# Patient Record
Sex: FEMALE | Race: OTHER | NOT HISPANIC OR LATINO | URBAN - METROPOLITAN AREA
[De-identification: names, ages, dates, MRNs, and addresses within clinical notes are randomized per-mention and may not be internally consistent; named-entity substitution may affect disease eponyms.]

---

## 2017-09-28 PROBLEM — Z00.00 ENCOUNTER FOR PREVENTIVE HEALTH EXAMINATION: Status: ACTIVE | Noted: 2017-09-28

## 2023-12-19 ENCOUNTER — INPATIENT (INPATIENT)
Facility: HOSPITAL | Age: 38
LOS: 0 days | Discharge: HOME | DRG: 919 | End: 2023-12-20
Attending: OBSTETRICS & GYNECOLOGY | Admitting: OBSTETRICS & GYNECOLOGY
Payer: COMMERCIAL

## 2023-12-19 VITALS
DIASTOLIC BLOOD PRESSURE: 74 MMHG | HEIGHT: 62 IN | SYSTOLIC BLOOD PRESSURE: 107 MMHG | HEART RATE: 89 BPM | OXYGEN SATURATION: 98 % | WEIGHT: 117.95 LBS | RESPIRATION RATE: 18 BRPM | TEMPERATURE: 98 F

## 2023-12-19 DIAGNOSIS — R10.9 UNSPECIFIED ABDOMINAL PAIN: ICD-10-CM

## 2023-12-19 DIAGNOSIS — Z98.890 OTHER SPECIFIED POSTPROCEDURAL STATES: Chronic | ICD-10-CM

## 2023-12-19 LAB
ALBUMIN SERPL ELPH-MCNC: 4.1 G/DL — SIGNIFICANT CHANGE UP (ref 3.3–5)
ALBUMIN SERPL ELPH-MCNC: 4.1 G/DL — SIGNIFICANT CHANGE UP (ref 3.3–5)
ALP SERPL-CCNC: 224 U/L — HIGH (ref 40–120)
ALP SERPL-CCNC: 224 U/L — HIGH (ref 40–120)
ALT FLD-CCNC: 56 U/L — HIGH (ref 10–45)
ALT FLD-CCNC: 56 U/L — HIGH (ref 10–45)
ANION GAP SERPL CALC-SCNC: 9 MMOL/L — SIGNIFICANT CHANGE UP (ref 5–17)
ANION GAP SERPL CALC-SCNC: 9 MMOL/L — SIGNIFICANT CHANGE UP (ref 5–17)
APPEARANCE UR: CLEAR — SIGNIFICANT CHANGE UP
APPEARANCE UR: CLEAR — SIGNIFICANT CHANGE UP
APTT BLD: 29.6 SEC — SIGNIFICANT CHANGE UP (ref 24.5–35.6)
APTT BLD: 29.6 SEC — SIGNIFICANT CHANGE UP (ref 24.5–35.6)
AST SERPL-CCNC: 35 U/L — SIGNIFICANT CHANGE UP (ref 10–40)
AST SERPL-CCNC: 35 U/L — SIGNIFICANT CHANGE UP (ref 10–40)
BACTERIA # UR AUTO: NEGATIVE /HPF — SIGNIFICANT CHANGE UP
BACTERIA # UR AUTO: NEGATIVE /HPF — SIGNIFICANT CHANGE UP
BASE EXCESS BLDV CALC-SCNC: -1.1 MMOL/L — SIGNIFICANT CHANGE UP (ref -2–3)
BASE EXCESS BLDV CALC-SCNC: -1.1 MMOL/L — SIGNIFICANT CHANGE UP (ref -2–3)
BASOPHILS # BLD AUTO: 0.04 K/UL — SIGNIFICANT CHANGE UP (ref 0–0.2)
BASOPHILS # BLD AUTO: 0.04 K/UL — SIGNIFICANT CHANGE UP (ref 0–0.2)
BASOPHILS # BLD AUTO: 0.05 K/UL — SIGNIFICANT CHANGE UP (ref 0–0.2)
BASOPHILS # BLD AUTO: 0.05 K/UL — SIGNIFICANT CHANGE UP (ref 0–0.2)
BASOPHILS NFR BLD AUTO: 0.6 % — SIGNIFICANT CHANGE UP (ref 0–2)
BASOPHILS NFR BLD AUTO: 0.6 % — SIGNIFICANT CHANGE UP (ref 0–2)
BASOPHILS NFR BLD AUTO: 0.8 % — SIGNIFICANT CHANGE UP (ref 0–2)
BASOPHILS NFR BLD AUTO: 0.8 % — SIGNIFICANT CHANGE UP (ref 0–2)
BILIRUB SERPL-MCNC: 0.3 MG/DL — SIGNIFICANT CHANGE UP (ref 0.2–1.2)
BILIRUB SERPL-MCNC: 0.3 MG/DL — SIGNIFICANT CHANGE UP (ref 0.2–1.2)
BILIRUB UR-MCNC: NEGATIVE — SIGNIFICANT CHANGE UP
BILIRUB UR-MCNC: NEGATIVE — SIGNIFICANT CHANGE UP
BUN SERPL-MCNC: 14 MG/DL — SIGNIFICANT CHANGE UP (ref 7–23)
BUN SERPL-MCNC: 14 MG/DL — SIGNIFICANT CHANGE UP (ref 7–23)
CA-I SERPL-SCNC: 1.23 MMOL/L — SIGNIFICANT CHANGE UP (ref 1.15–1.33)
CA-I SERPL-SCNC: 1.23 MMOL/L — SIGNIFICANT CHANGE UP (ref 1.15–1.33)
CALCIUM SERPL-MCNC: 9.2 MG/DL — SIGNIFICANT CHANGE UP (ref 8.4–10.5)
CALCIUM SERPL-MCNC: 9.2 MG/DL — SIGNIFICANT CHANGE UP (ref 8.4–10.5)
CAST: 0 /LPF — SIGNIFICANT CHANGE UP (ref 0–4)
CAST: 0 /LPF — SIGNIFICANT CHANGE UP (ref 0–4)
CHLORIDE BLDV-SCNC: 105 MMOL/L — SIGNIFICANT CHANGE UP (ref 96–108)
CHLORIDE BLDV-SCNC: 105 MMOL/L — SIGNIFICANT CHANGE UP (ref 96–108)
CHLORIDE SERPL-SCNC: 106 MMOL/L — SIGNIFICANT CHANGE UP (ref 96–108)
CHLORIDE SERPL-SCNC: 106 MMOL/L — SIGNIFICANT CHANGE UP (ref 96–108)
CO2 BLDV-SCNC: 27 MMOL/L — HIGH (ref 22–26)
CO2 BLDV-SCNC: 27 MMOL/L — HIGH (ref 22–26)
CO2 SERPL-SCNC: 23 MMOL/L — SIGNIFICANT CHANGE UP (ref 22–31)
CO2 SERPL-SCNC: 23 MMOL/L — SIGNIFICANT CHANGE UP (ref 22–31)
COLOR SPEC: YELLOW — SIGNIFICANT CHANGE UP
COLOR SPEC: YELLOW — SIGNIFICANT CHANGE UP
CREAT SERPL-MCNC: 0.53 MG/DL — SIGNIFICANT CHANGE UP (ref 0.5–1.3)
CREAT SERPL-MCNC: 0.53 MG/DL — SIGNIFICANT CHANGE UP (ref 0.5–1.3)
DIFF PNL FLD: ABNORMAL
DIFF PNL FLD: ABNORMAL
EGFR: 123 ML/MIN/1.73M2 — SIGNIFICANT CHANGE UP
EGFR: 123 ML/MIN/1.73M2 — SIGNIFICANT CHANGE UP
EOSINOPHIL # BLD AUTO: 0.06 K/UL — SIGNIFICANT CHANGE UP (ref 0–0.5)
EOSINOPHIL # BLD AUTO: 0.06 K/UL — SIGNIFICANT CHANGE UP (ref 0–0.5)
EOSINOPHIL # BLD AUTO: 0.09 K/UL — SIGNIFICANT CHANGE UP (ref 0–0.5)
EOSINOPHIL # BLD AUTO: 0.09 K/UL — SIGNIFICANT CHANGE UP (ref 0–0.5)
EOSINOPHIL NFR BLD AUTO: 0.8 % — SIGNIFICANT CHANGE UP (ref 0–6)
EOSINOPHIL NFR BLD AUTO: 0.8 % — SIGNIFICANT CHANGE UP (ref 0–6)
EOSINOPHIL NFR BLD AUTO: 1.7 % — SIGNIFICANT CHANGE UP (ref 0–6)
EOSINOPHIL NFR BLD AUTO: 1.7 % — SIGNIFICANT CHANGE UP (ref 0–6)
GAS PNL BLDV: 135 MMOL/L — LOW (ref 136–145)
GAS PNL BLDV: 135 MMOL/L — LOW (ref 136–145)
GAS PNL BLDV: SIGNIFICANT CHANGE UP
GLUCOSE BLDV-MCNC: 100 MG/DL — HIGH (ref 70–99)
GLUCOSE BLDV-MCNC: 100 MG/DL — HIGH (ref 70–99)
GLUCOSE SERPL-MCNC: 101 MG/DL — HIGH (ref 70–99)
GLUCOSE SERPL-MCNC: 101 MG/DL — HIGH (ref 70–99)
GLUCOSE UR QL: NEGATIVE MG/DL — SIGNIFICANT CHANGE UP
GLUCOSE UR QL: NEGATIVE MG/DL — SIGNIFICANT CHANGE UP
HCG SERPL-ACNC: <2 MIU/ML — SIGNIFICANT CHANGE UP
HCG SERPL-ACNC: <2 MIU/ML — SIGNIFICANT CHANGE UP
HCO3 BLDV-SCNC: 25 MMOL/L — SIGNIFICANT CHANGE UP (ref 22–29)
HCO3 BLDV-SCNC: 25 MMOL/L — SIGNIFICANT CHANGE UP (ref 22–29)
HCT VFR BLD CALC: 33.7 % — LOW (ref 34.5–45)
HCT VFR BLD CALC: 33.7 % — LOW (ref 34.5–45)
HCT VFR BLD CALC: 33.9 % — LOW (ref 34.5–45)
HCT VFR BLD CALC: 33.9 % — LOW (ref 34.5–45)
HCT VFR BLD CALC: 39 % — SIGNIFICANT CHANGE UP (ref 34.5–45)
HCT VFR BLD CALC: 39 % — SIGNIFICANT CHANGE UP (ref 34.5–45)
HCT VFR BLDA CALC: 39 % — SIGNIFICANT CHANGE UP (ref 34.5–46.5)
HCT VFR BLDA CALC: 39 % — SIGNIFICANT CHANGE UP (ref 34.5–46.5)
HGB BLD CALC-MCNC: 12.9 G/DL — SIGNIFICANT CHANGE UP (ref 11.7–16.1)
HGB BLD CALC-MCNC: 12.9 G/DL — SIGNIFICANT CHANGE UP (ref 11.7–16.1)
HGB BLD-MCNC: 10.6 G/DL — LOW (ref 11.5–15.5)
HGB BLD-MCNC: 10.6 G/DL — LOW (ref 11.5–15.5)
HGB BLD-MCNC: 10.9 G/DL — LOW (ref 11.5–15.5)
HGB BLD-MCNC: 10.9 G/DL — LOW (ref 11.5–15.5)
HGB BLD-MCNC: 12.2 G/DL — SIGNIFICANT CHANGE UP (ref 11.5–15.5)
HGB BLD-MCNC: 12.2 G/DL — SIGNIFICANT CHANGE UP (ref 11.5–15.5)
IMM GRANULOCYTES NFR BLD AUTO: 0.2 % — SIGNIFICANT CHANGE UP (ref 0–0.9)
IMM GRANULOCYTES NFR BLD AUTO: 0.2 % — SIGNIFICANT CHANGE UP (ref 0–0.9)
IMM GRANULOCYTES NFR BLD AUTO: 0.4 % — SIGNIFICANT CHANGE UP (ref 0–0.9)
IMM GRANULOCYTES NFR BLD AUTO: 0.4 % — SIGNIFICANT CHANGE UP (ref 0–0.9)
INR BLD: 1.07 RATIO — SIGNIFICANT CHANGE UP (ref 0.85–1.18)
INR BLD: 1.07 RATIO — SIGNIFICANT CHANGE UP (ref 0.85–1.18)
KETONES UR-MCNC: NEGATIVE MG/DL — SIGNIFICANT CHANGE UP
KETONES UR-MCNC: NEGATIVE MG/DL — SIGNIFICANT CHANGE UP
LACTATE BLDV-MCNC: 0.9 MMOL/L — SIGNIFICANT CHANGE UP (ref 0.5–2)
LACTATE BLDV-MCNC: 0.9 MMOL/L — SIGNIFICANT CHANGE UP (ref 0.5–2)
LEUKOCYTE ESTERASE UR-ACNC: NEGATIVE — SIGNIFICANT CHANGE UP
LEUKOCYTE ESTERASE UR-ACNC: NEGATIVE — SIGNIFICANT CHANGE UP
LIDOCAIN IGE QN: 24 U/L — SIGNIFICANT CHANGE UP (ref 7–60)
LIDOCAIN IGE QN: 24 U/L — SIGNIFICANT CHANGE UP (ref 7–60)
LYMPHOCYTES # BLD AUTO: 1.28 K/UL — SIGNIFICANT CHANGE UP (ref 1–3.3)
LYMPHOCYTES # BLD AUTO: 1.28 K/UL — SIGNIFICANT CHANGE UP (ref 1–3.3)
LYMPHOCYTES # BLD AUTO: 1.32 K/UL — SIGNIFICANT CHANGE UP (ref 1–3.3)
LYMPHOCYTES # BLD AUTO: 1.32 K/UL — SIGNIFICANT CHANGE UP (ref 1–3.3)
LYMPHOCYTES # BLD AUTO: 16.4 % — SIGNIFICANT CHANGE UP (ref 13–44)
LYMPHOCYTES # BLD AUTO: 16.4 % — SIGNIFICANT CHANGE UP (ref 13–44)
LYMPHOCYTES # BLD AUTO: 25 % — SIGNIFICANT CHANGE UP (ref 13–44)
LYMPHOCYTES # BLD AUTO: 25 % — SIGNIFICANT CHANGE UP (ref 13–44)
MCHC RBC-ENTMCNC: 29.8 PG — SIGNIFICANT CHANGE UP (ref 27–34)
MCHC RBC-ENTMCNC: 30.1 PG — SIGNIFICANT CHANGE UP (ref 27–34)
MCHC RBC-ENTMCNC: 30.1 PG — SIGNIFICANT CHANGE UP (ref 27–34)
MCHC RBC-ENTMCNC: 31.3 GM/DL — LOW (ref 32–36)
MCHC RBC-ENTMCNC: 31.3 GM/DL — LOW (ref 32–36)
MCHC RBC-ENTMCNC: 31.5 GM/DL — LOW (ref 32–36)
MCHC RBC-ENTMCNC: 31.5 GM/DL — LOW (ref 32–36)
MCHC RBC-ENTMCNC: 32.2 GM/DL — SIGNIFICANT CHANGE UP (ref 32–36)
MCHC RBC-ENTMCNC: 32.2 GM/DL — SIGNIFICANT CHANGE UP (ref 32–36)
MCV RBC AUTO: 93.6 FL — SIGNIFICANT CHANGE UP (ref 80–100)
MCV RBC AUTO: 93.6 FL — SIGNIFICANT CHANGE UP (ref 80–100)
MCV RBC AUTO: 94.7 FL — SIGNIFICANT CHANGE UP (ref 80–100)
MCV RBC AUTO: 94.7 FL — SIGNIFICANT CHANGE UP (ref 80–100)
MCV RBC AUTO: 95.1 FL — SIGNIFICANT CHANGE UP (ref 80–100)
MCV RBC AUTO: 95.1 FL — SIGNIFICANT CHANGE UP (ref 80–100)
MONOCYTES # BLD AUTO: 0.5 K/UL — SIGNIFICANT CHANGE UP (ref 0–0.9)
MONOCYTES # BLD AUTO: 0.5 K/UL — SIGNIFICANT CHANGE UP (ref 0–0.9)
MONOCYTES # BLD AUTO: 0.7 K/UL — SIGNIFICANT CHANGE UP (ref 0–0.9)
MONOCYTES # BLD AUTO: 0.7 K/UL — SIGNIFICANT CHANGE UP (ref 0–0.9)
MONOCYTES NFR BLD AUTO: 9 % — SIGNIFICANT CHANGE UP (ref 2–14)
MONOCYTES NFR BLD AUTO: 9 % — SIGNIFICANT CHANGE UP (ref 2–14)
MONOCYTES NFR BLD AUTO: 9.5 % — SIGNIFICANT CHANGE UP (ref 2–14)
MONOCYTES NFR BLD AUTO: 9.5 % — SIGNIFICANT CHANGE UP (ref 2–14)
NEUTROPHILS # BLD AUTO: 3.32 K/UL — SIGNIFICANT CHANGE UP (ref 1.8–7.4)
NEUTROPHILS # BLD AUTO: 3.32 K/UL — SIGNIFICANT CHANGE UP (ref 1.8–7.4)
NEUTROPHILS # BLD AUTO: 5.69 K/UL — SIGNIFICANT CHANGE UP (ref 1.8–7.4)
NEUTROPHILS # BLD AUTO: 5.69 K/UL — SIGNIFICANT CHANGE UP (ref 1.8–7.4)
NEUTROPHILS NFR BLD AUTO: 62.8 % — SIGNIFICANT CHANGE UP (ref 43–77)
NEUTROPHILS NFR BLD AUTO: 62.8 % — SIGNIFICANT CHANGE UP (ref 43–77)
NEUTROPHILS NFR BLD AUTO: 72.8 % — SIGNIFICANT CHANGE UP (ref 43–77)
NEUTROPHILS NFR BLD AUTO: 72.8 % — SIGNIFICANT CHANGE UP (ref 43–77)
NITRITE UR-MCNC: NEGATIVE — SIGNIFICANT CHANGE UP
NITRITE UR-MCNC: NEGATIVE — SIGNIFICANT CHANGE UP
NRBC # BLD: 0 /100 WBCS — SIGNIFICANT CHANGE UP (ref 0–0)
PCO2 BLDV: 48 MMHG — HIGH (ref 39–42)
PCO2 BLDV: 48 MMHG — HIGH (ref 39–42)
PH BLDV: 7.33 — SIGNIFICANT CHANGE UP (ref 7.32–7.43)
PH BLDV: 7.33 — SIGNIFICANT CHANGE UP (ref 7.32–7.43)
PH UR: 5 — SIGNIFICANT CHANGE UP (ref 5–8)
PH UR: 5 — SIGNIFICANT CHANGE UP (ref 5–8)
PLATELET # BLD AUTO: 337 K/UL — SIGNIFICANT CHANGE UP (ref 150–400)
PLATELET # BLD AUTO: 337 K/UL — SIGNIFICANT CHANGE UP (ref 150–400)
PLATELET # BLD AUTO: 388 K/UL — SIGNIFICANT CHANGE UP (ref 150–400)
PLATELET # BLD AUTO: 388 K/UL — SIGNIFICANT CHANGE UP (ref 150–400)
PLATELET # BLD AUTO: 430 K/UL — HIGH (ref 150–400)
PLATELET # BLD AUTO: 430 K/UL — HIGH (ref 150–400)
PO2 BLDV: 39 MMHG — SIGNIFICANT CHANGE UP (ref 25–45)
PO2 BLDV: 39 MMHG — SIGNIFICANT CHANGE UP (ref 25–45)
POTASSIUM BLDV-SCNC: 4.2 MMOL/L — SIGNIFICANT CHANGE UP (ref 3.5–5.1)
POTASSIUM BLDV-SCNC: 4.2 MMOL/L — SIGNIFICANT CHANGE UP (ref 3.5–5.1)
POTASSIUM SERPL-MCNC: 4.2 MMOL/L — SIGNIFICANT CHANGE UP (ref 3.5–5.3)
POTASSIUM SERPL-MCNC: 4.2 MMOL/L — SIGNIFICANT CHANGE UP (ref 3.5–5.3)
POTASSIUM SERPL-SCNC: 4.2 MMOL/L — SIGNIFICANT CHANGE UP (ref 3.5–5.3)
POTASSIUM SERPL-SCNC: 4.2 MMOL/L — SIGNIFICANT CHANGE UP (ref 3.5–5.3)
PROT SERPL-MCNC: 7.1 G/DL — SIGNIFICANT CHANGE UP (ref 6–8.3)
PROT SERPL-MCNC: 7.1 G/DL — SIGNIFICANT CHANGE UP (ref 6–8.3)
PROT UR-MCNC: NEGATIVE MG/DL — SIGNIFICANT CHANGE UP
PROT UR-MCNC: NEGATIVE MG/DL — SIGNIFICANT CHANGE UP
PROTHROM AB SERPL-ACNC: 11.7 SEC — SIGNIFICANT CHANGE UP (ref 9.5–13)
PROTHROM AB SERPL-ACNC: 11.7 SEC — SIGNIFICANT CHANGE UP (ref 9.5–13)
RBC # BLD: 3.56 M/UL — LOW (ref 3.8–5.2)
RBC # BLD: 3.56 M/UL — LOW (ref 3.8–5.2)
RBC # BLD: 3.62 M/UL — LOW (ref 3.8–5.2)
RBC # BLD: 3.62 M/UL — LOW (ref 3.8–5.2)
RBC # BLD: 4.1 M/UL — SIGNIFICANT CHANGE UP (ref 3.8–5.2)
RBC # BLD: 4.1 M/UL — SIGNIFICANT CHANGE UP (ref 3.8–5.2)
RBC # FLD: 13.1 % — SIGNIFICANT CHANGE UP (ref 10.3–14.5)
RBC # FLD: 13.1 % — SIGNIFICANT CHANGE UP (ref 10.3–14.5)
RBC # FLD: 13.2 % — SIGNIFICANT CHANGE UP (ref 10.3–14.5)
RBC # FLD: 13.2 % — SIGNIFICANT CHANGE UP (ref 10.3–14.5)
RBC # FLD: 13.3 % — SIGNIFICANT CHANGE UP (ref 10.3–14.5)
RBC # FLD: 13.3 % — SIGNIFICANT CHANGE UP (ref 10.3–14.5)
RBC CASTS # UR COMP ASSIST: 0 /HPF — SIGNIFICANT CHANGE UP (ref 0–4)
RBC CASTS # UR COMP ASSIST: 0 /HPF — SIGNIFICANT CHANGE UP (ref 0–4)
SAO2 % BLDV: 58.6 % — LOW (ref 67–88)
SAO2 % BLDV: 58.6 % — LOW (ref 67–88)
SODIUM SERPL-SCNC: 138 MMOL/L — SIGNIFICANT CHANGE UP (ref 135–145)
SODIUM SERPL-SCNC: 138 MMOL/L — SIGNIFICANT CHANGE UP (ref 135–145)
SP GR SPEC: 1.03 — SIGNIFICANT CHANGE UP (ref 1–1.03)
SP GR SPEC: 1.03 — SIGNIFICANT CHANGE UP (ref 1–1.03)
SQUAMOUS # UR AUTO: 5 /HPF — SIGNIFICANT CHANGE UP (ref 0–5)
SQUAMOUS # UR AUTO: 5 /HPF — SIGNIFICANT CHANGE UP (ref 0–5)
TROPONIN T, HIGH SENSITIVITY RESULT: <6 NG/L — SIGNIFICANT CHANGE UP (ref 0–51)
TROPONIN T, HIGH SENSITIVITY RESULT: <6 NG/L — SIGNIFICANT CHANGE UP (ref 0–51)
UROBILINOGEN FLD QL: 0.2 MG/DL — SIGNIFICANT CHANGE UP (ref 0.2–1)
UROBILINOGEN FLD QL: 0.2 MG/DL — SIGNIFICANT CHANGE UP (ref 0.2–1)
WBC # BLD: 5.28 K/UL — SIGNIFICANT CHANGE UP (ref 3.8–10.5)
WBC # BLD: 5.28 K/UL — SIGNIFICANT CHANGE UP (ref 3.8–10.5)
WBC # BLD: 6.09 K/UL — SIGNIFICANT CHANGE UP (ref 3.8–10.5)
WBC # BLD: 6.09 K/UL — SIGNIFICANT CHANGE UP (ref 3.8–10.5)
WBC # BLD: 7.81 K/UL — SIGNIFICANT CHANGE UP (ref 3.8–10.5)
WBC # BLD: 7.81 K/UL — SIGNIFICANT CHANGE UP (ref 3.8–10.5)
WBC # FLD AUTO: 5.28 K/UL — SIGNIFICANT CHANGE UP (ref 3.8–10.5)
WBC # FLD AUTO: 5.28 K/UL — SIGNIFICANT CHANGE UP (ref 3.8–10.5)
WBC # FLD AUTO: 6.09 K/UL — SIGNIFICANT CHANGE UP (ref 3.8–10.5)
WBC # FLD AUTO: 6.09 K/UL — SIGNIFICANT CHANGE UP (ref 3.8–10.5)
WBC # FLD AUTO: 7.81 K/UL — SIGNIFICANT CHANGE UP (ref 3.8–10.5)
WBC # FLD AUTO: 7.81 K/UL — SIGNIFICANT CHANGE UP (ref 3.8–10.5)
WBC UR QL: 1 /HPF — SIGNIFICANT CHANGE UP (ref 0–5)
WBC UR QL: 1 /HPF — SIGNIFICANT CHANGE UP (ref 0–5)

## 2023-12-19 PROCEDURE — 93975 VASCULAR STUDY: CPT | Mod: 26

## 2023-12-19 PROCEDURE — 74177 CT ABD & PELVIS W/CONTRAST: CPT | Mod: 26,MA

## 2023-12-19 PROCEDURE — 99222 1ST HOSP IP/OBS MODERATE 55: CPT

## 2023-12-19 PROCEDURE — 76830 TRANSVAGINAL US NON-OB: CPT | Mod: 26

## 2023-12-19 PROCEDURE — 71275 CT ANGIOGRAPHY CHEST: CPT | Mod: 26,MA

## 2023-12-19 PROCEDURE — 99285 EMERGENCY DEPT VISIT HI MDM: CPT

## 2023-12-19 PROCEDURE — 71045 X-RAY EXAM CHEST 1 VIEW: CPT | Mod: 26

## 2023-12-19 RX ORDER — HYDROMORPHONE HYDROCHLORIDE 2 MG/ML
0.5 INJECTION INTRAMUSCULAR; INTRAVENOUS; SUBCUTANEOUS ONCE
Refills: 0 | Status: DISCONTINUED | OUTPATIENT
Start: 2023-12-19 | End: 2023-12-19

## 2023-12-19 RX ORDER — KETOROLAC TROMETHAMINE 30 MG/ML
15 SYRINGE (ML) INJECTION ONCE
Refills: 0 | Status: DISCONTINUED | OUTPATIENT
Start: 2023-12-19 | End: 2023-12-19

## 2023-12-19 RX ORDER — SODIUM CHLORIDE 9 MG/ML
1000 INJECTION, SOLUTION INTRAVENOUS
Refills: 0 | Status: DISCONTINUED | OUTPATIENT
Start: 2023-12-19 | End: 2023-12-20

## 2023-12-19 RX ORDER — ACETAMINOPHEN 500 MG
1000 TABLET ORAL ONCE
Refills: 0 | Status: COMPLETED | OUTPATIENT
Start: 2023-12-19 | End: 2023-12-19

## 2023-12-19 RX ORDER — SODIUM CHLORIDE 9 MG/ML
1000 INJECTION, SOLUTION INTRAVENOUS ONCE
Refills: 0 | Status: COMPLETED | OUTPATIENT
Start: 2023-12-19 | End: 2023-12-19

## 2023-12-19 RX ORDER — ACETAMINOPHEN 500 MG
1000 TABLET ORAL ONCE
Refills: 0 | Status: COMPLETED | OUTPATIENT
Start: 2023-12-20 | End: 2023-12-20

## 2023-12-19 RX ADMIN — SODIUM CHLORIDE 125 MILLILITER(S): 9 INJECTION, SOLUTION INTRAVENOUS at 17:45

## 2023-12-19 RX ADMIN — SODIUM CHLORIDE 1000 MILLILITER(S): 9 INJECTION, SOLUTION INTRAVENOUS at 08:11

## 2023-12-19 RX ADMIN — Medication 1000 MILLIGRAM(S): at 22:09

## 2023-12-19 RX ADMIN — Medication 15 MILLIGRAM(S): at 11:33

## 2023-12-19 RX ADMIN — Medication 400 MILLIGRAM(S): at 08:48

## 2023-12-19 RX ADMIN — Medication 1000 MILLIGRAM(S): at 09:02

## 2023-12-19 RX ADMIN — HYDROMORPHONE HYDROCHLORIDE 0.5 MILLIGRAM(S): 2 INJECTION INTRAMUSCULAR; INTRAVENOUS; SUBCUTANEOUS at 17:44

## 2023-12-19 RX ADMIN — Medication 15 MILLIGRAM(S): at 23:53

## 2023-12-19 RX ADMIN — HYDROMORPHONE HYDROCHLORIDE 0.5 MILLIGRAM(S): 2 INJECTION INTRAMUSCULAR; INTRAVENOUS; SUBCUTANEOUS at 08:11

## 2023-12-19 RX ADMIN — HYDROMORPHONE HYDROCHLORIDE 0.5 MILLIGRAM(S): 2 INJECTION INTRAMUSCULAR; INTRAVENOUS; SUBCUTANEOUS at 09:02

## 2023-12-19 RX ADMIN — Medication 400 MILLIGRAM(S): at 20:26

## 2023-12-19 NOTE — H&P ADULT - NSHPPHYSICALEXAM_GEN_ALL_CORE
Vital Signs Last 24 Hrs  T(C): 36.7 (19 Dec 2023 08:10), Max: 36.7 (19 Dec 2023 06:16)  T(F): 98.1 (19 Dec 2023 08:10), Max: 98.1 (19 Dec 2023 06:16)  HR: 71 (19 Dec 2023 16:40) (61 - 89)  BP: 97/63 (19 Dec 2023 16:40) (97/63 - 124/74)  BP(mean): 74 (19 Dec 2023 16:40) (74 - 74)  RR: 17 (19 Dec 2023 16:40) (17 - 20)  SpO2: 98% (19 Dec 2023 16:40) (98% - 100%)    Parameters below as of 19 Dec 2023 16:40  Patient On (Oxygen Delivery Method): room air    Physical Exam:   General: sitting comfortably in bed, NAD   HEENT: neck supple, full ROM  CV: extremities well perfused  Lungs: normal respiratory effort on room air  Back: No CVA tenderness  Abd: Soft, non-tender, non-distended  :  No bleeding on pad.  External labia wnl. Bimanual exam with no cervical motion tenderness, uterus wnl, adnexa non palpable b/l.  Cervix closed vs. Cervix dilated  Speculum Exam: No active bleeding from os.  Ext: non-tender b/l, no edema Vital Signs Last 24 Hrs  T(C): 36.7 (19 Dec 2023 08:10), Max: 36.7 (19 Dec 2023 06:16)  T(F): 98.1 (19 Dec 2023 08:10), Max: 98.1 (19 Dec 2023 06:16)  HR: 71 (19 Dec 2023 16:40) (61 - 89)  BP: 97/63 (19 Dec 2023 16:40) (97/63 - 124/74)  BP(mean): 74 (19 Dec 2023 16:40) (74 - 74)  RR: 17 (19 Dec 2023 16:40) (17 - 20)  SpO2: 98% (19 Dec 2023 16:40) (98% - 100%)    Parameters below as of 19 Dec 2023 16:40  Patient On (Oxygen Delivery Method): room air    Physical Exam: pt declined chaperone  General: laying comfortably in bed, NAD, sleeping upon entering room  HEENT: neck supple, full ROM  CV: extremities well perfused  Lungs: normal respiratory effort on room air  Abd: Soft, nondistended, abdomen diffusely tender to palpation, no guarding  : Bimanual exam with no cervical motion tenderness. Discomfort diffusely to palpation of b/l adnexa.  Ext: non-tender b/l, no edema

## 2023-12-19 NOTE — ED ADULT NURSE NOTE - OBJECTIVE STATEMENT
38 y.o F A&OX3 with PMH of PCOS presents to the ED c.o sharp RUQ pain radiating up back to neck x2 days. Pt. reports she had egg retrieval procedure on 12/6 and developed hemoperitoneum after procedure which was diagnosed at [facility]. States no treatment was done due to stable hemoglobin levels and was discharged home on oxycodone. Pt. reports she developed pain to RUQ on Sunday x2 days ago and pain progressively worsened now radiating up R back to neck. Rates pain level 10/10. Pt. visibly uncomfortable on initial assessment. Reports last took Tylenol at midnight with minimal relief. Denies fever, chills, nausea, vomiting, diarrhea, melena, hx of dvts, hemoptysis, recent calf pain, or swelling or any other symptoms at this time. EKG done. Pt. placed on CM. IV access obtained. Safety and comfort provided. Family at bedside. 38 y.o F A&OX3 with PMH of PCOS presents to the ED c.o sharp RUQ pain radiating up back to neck x2 days. Pt. reports she had egg retrieval procedure on 12/6 and developed hemoperitoneum after procedure which was diagnosed at [facility]. States no treatment was done due to stable hemoglobin levels and was discharged home on oxycodone. Pt. reports she developed pain to RUQ on Sunday x2 days ago and pain progressively worsened now radiating up R back to neck. Rates pain level 10/10. Pt. visibly uncomfortable on initial assessment. Reports last took Tylenol at midnight with minimal relief. Denies fever, chills, nausea, vomiting, diarrhea, melena, hx of dvts, hemoptysis, recent calf pain, or swelling or any other symptoms at this time. EKG done. Pt. placed on CM. IV access obtained. Safety and comfort provided. Family at bedside.

## 2023-12-19 NOTE — ED ADULT NURSE NOTE - NSFALLUNIVINTERV_ED_ALL_ED
Bed/Stretcher in lowest position, wheels locked, appropriate side rails in place/Call bell, personal items and telephone in reach/Instruct patient to call for assistance before getting out of bed/chair/stretcher/Non-slip footwear applied when patient is off stretcher/[place] to call system/Physically safe environment - no spills, clutter or unnecessary equipment/Purposeful proactive rounding/Room/bathroom lighting operational, light cord in reach Bed/Stretcher in lowest position, wheels locked, appropriate side rails in place/Call bell, personal items and telephone in reach/Instruct patient to call for assistance before getting out of bed/chair/stretcher/Non-slip footwear applied when patient is off stretcher/[place] to call system/Physically safe environment - no spills, clutter or unnecessary equipment/Purposeful proactive rounding/Room/bathroom lighting operational, light cord in reach

## 2023-12-19 NOTE — H&P ADULT - ASSESSMENT
Patient is a 37yo G0 LMP 12/11 now 13 days s/p egg retrieval (12/6/23) with [facility] presenting for acute worsening of right upper abdominal pain radiating to right chest and neck. On arrival to ED, patient's vital signs wnl, labs significant for Hgb 12.2, TVUS showing enlarged ovaries bilaterally R>L, with left side showing evidence of recently ruptured hemorrhagic cyst. Pelvic sono also significant for known hemoperitoneum, likely stable from previous in the setting of normal VS and Hgb higher than patient's report from prior; however, no access to prior studies for comparison. Low suspicion for ovarian torsion at this time, though will admit to GYN for serial abdominal exams and pain control.    Neuro: IV Tylenol prn  Cardio: VSS  - H/H: 12.2/39.0 -> 10.6/33.7  - Plan for repeat CBC@10p and AM  Resp: no acute issues  GI: NPO during this time of serial abd exams and continued observation  : voiding spontaneously  Heme: SCDs for DVT ppx, will hold HSQ  Endo: no active issues  ID: no active issues  Dispo: continue inpatient observation    Patient seen at bedside with GYN service attending, Dr. Chu Yuen PGY2 Patient is a 37yo G0 LMP 12/11 now 13 days s/p egg retrieval (12/6/23) with [facility] presenting for acute worsening of right upper abdominal pain radiating to right chest and neck. On arrival to ED, patient's vital signs wnl, labs significant for Hgb 12.2, TVUS showing enlarged ovaries bilaterally R>L, with left side showing evidence of recently ruptured hemorrhagic cyst. Pelvic sono also significant for known hemoperitoneum, likely stable from previous in the setting of normal VS and Hgb higher than patient's report from prior; however, no access to prior studies for comparison. Low suspicion for ovarian torsion at this time, though will admit to GYN for serial abdominal exams and pain control.    Neuro: IV Tylenol prn  Cardio: VSS  - H/H: 12.2/39.0 -> 10.6/33.7  - Plan for repeat CBC@10p and AM  Resp: no acute issues  GI: NPO during this time of serial abd exams and continued observation  : voiding spontaneously  Heme: SCDs for DVT ppx, will hold HSQ  Endo: no active issues  ID: no active issues  Dispo: continue inpatient observation    Patient seen at bedside with GYN service attending, Dr. Chu Yuen PGY2

## 2023-12-19 NOTE — ED PROVIDER NOTE - PHYSICAL EXAMINATION
Danielle Cabezas DO (PGY1)   Physical Exam:    Gen: crying in distress due to pain, AOx3, able to ambulate without assistance  Lung: CTAB, no respiratory distress, no wheezes/rhonchi/rales B/L  CV: RRR, no murmurs, rubs or gallops. no ttp on palpation of anterior chest wall  Abd: diffusely ttp, especially in RUQ

## 2023-12-19 NOTE — ED PROVIDER NOTE - ATTENDING CONTRIBUTION TO CARE
Attending MD Horne:  I personally have seen and examined this patient. I have performed a substantive portion of the visit including all aspects of the medical decision making.  Resident note reviewed and agree on plan of care and except where noted.        38-year-old woman presenting for evaluation of severe right upper quadrant epigastric abdominal pain radiating to the shoulder for 2 days.  Pain is sharp, is worse when she is moving, lying on that side and breathing deeply.  Denies any shortness of breath.  No calf swelling bilaterally.  Of note, patient recently had egg retrieval procedure on 12/6 at [facility] (patient does not recall which physician performed procedure).  Egg retrieval procedure was reportedly complicated by internal bleeding per patient, this was identified on a CT scan.  She was observed overnight but did not require transfusion, she was discharged with pain medication.  The pain she is having currently she states is different than the pain she had when she was found to have internal bleeding from the egg retrieval procedure.    Patient on exam with normal vital signs, she is sitting in stretcher tearful uncomfortable, clear lungs posteriorly regular heart sounds anteriorly.  Abdomen is soft nondistended focal tenderness to palpation in the epigastric and right upper quadrant regions with voluntary guarding.  Extremities warm and well-perfused no calf swelling bilaterally.  Distal pulses palpable upper and lower extremities.    ECG recorded at 624 independently interpreted by me, Dr Abhilash Horne, shows normal sinus rhythm normal axis normal intervals no ST elevation or ST depression.    Patient presenting for evaluation of severe right upper quadrant epigastric abdominal pain radiating to the shoulder, notable recent history for what sounds like hemoperitoneum from egg retrieval procedure performed on 12/6.  Patient is very tender in the epigastric and right upper quadrant regions, right shoulder pain is likely referred pain from diaphragmatic irritation.  Differential diagnosis would include recurrent hemoperitoneum, ovarian hyperstimulation syndrome, pulmonary embolism, symptomatic pleural effusion, pneumothorax.  Will obtain CT angiogram of the chest to exclude PE, CT abdomen pelvis to evaluate for hemoperitoneum.      *The above represents an initial assessment/impression. Please refer to progress notes for potential changes in patient clinical course* Attending MD Horne:  I personally have seen and examined this patient. I have performed a substantive portion of the visit including all aspects of the medical decision making.  Resident note reviewed and agree on plan of care and except where noted.        38-year-old woman presenting for evaluation of severe right upper quadrant epigastric abdominal pain radiating to the shoulder for 2 days.  Pain is sharp, is worse when she is moving, lying on that side and breathing deeply.  Denies any shortness of breath.  No calf swelling bilaterally.  Of note, patient recently had egg retrieval procedure on 12/6 at [facility] (patient does not recall which physician performed procedure).  Egg retrieval procedure was reportedly complicated by internal bleeding per patient, this was identified on a CT scan.  She was observed overnight but did not require transfusion, she was discharged with pain medication.  The pain she is having currently she states is different than the pain she had when she was found to have internal bleeding from the egg retrieval procedure.    Patient on exam with normal vital signs, she is sitting in stretcher tearful uncomfortable, clear lungs posteriorly regular heart sounds anteriorly.  Abdomen is soft nondistended focal tenderness to palpation in the epigastric and right upper quadrant regions with voluntary guarding.  Extremities warm and well-perfused no calf swelling bilaterally.  Distal pulses palpable upper and lower extremities.    ECG recorded at 624 independently interpreted by me, Dr Abhilash Horne, shows normal sinus rhythm normal axis normal intervals no ST elevation or ST depression.    Patient presenting for evaluation of severe right upper quadrant epigastric abdominal pain radiating to the shoulder, notable recent history for what sounds like hemoperitoneum from egg retrieval procedure performed on 12/6.  Patient is very tender in the epigastric and right upper quadrant regions, right shoulder pain is likely referred pain from diaphragmatic irritation.  Differential diagnosis would include recurrent hemoperitoneum, ovarian hyperstimulation syndrome, pulmonary embolism, symptomatic pleural effusion, pneumothorax.  Will obtain CT angiogram of the chest to exclude PE, CT abdomen pelvis to evaluate for hemoperitoneum.      *The above represents an initial assessment/impression. Please refer to progress notes for potential changes in patient clinical course*

## 2023-12-19 NOTE — CONSULT NOTE ADULT - SUBJECTIVE AND OBJECTIVE BOX
AGATA GROVE  38y  Female [id number]    HPI:        Name of GYN Physician:   OBHx:    GynHx: Denies fibroids, cysts, endometriosis, STI's, Abnormal pap smears   PMH:  PSH:  Meds:  Allx:  Social History:  Denies smoking use, drug use, alcohol use.   +occasional social alcohol use    Vital Signs Last 24 Hrs  T(C): 36.7 (19 Dec 2023 08:10), Max: 36.7 (19 Dec 2023 06:16)  T(F): 98.1 (19 Dec 2023 08:10), Max: 98.1 (19 Dec 2023 06:16)  HR: 61 (19 Dec 2023 13:19) (61 - 89)  BP: 118/64 (19 Dec 2023 13:19) (105/83 - 124/74)  RR: 20 (19 Dec 2023 13:19) (17 - 20)  SpO2: 98% (19 Dec 2023 13:19) (98% - 100%)    Parameters below as of 19 Dec 2023 13:19  Patient On (Oxygen Delivery Method): room air    Physical Exam:   General: sitting comfortably in bed, NAD   HEENT: neck supple, full ROM  CV: extremities well perfused  Lungs: normal respiratory effort on room air  Back: No CVA tenderness  Abd: Soft, non-tender, non-distended  :  No bleeding on pad.  External labia wnl. Bimanual exam with no cervical motion tenderness, uterus wnl, adnexa non palpable b/l.  Cervix closed vs. Cervix dilated  Speculum Exam: No active bleeding from os.  Ext: non-tender b/l, no edema     LABS:                        12.2   7.81  )-----------( 430      ( 19 Dec 2023 08:31 )             39.0         138  |  106  |  14  ----------------------------<  101<H>  4.2   |  23  |  0.53    Ca    9.2      19 Dec 2023 08:31    TPro  7.1  /  Alb  4.1  /  TBili  0.3  /  DBili  x   /  AST  35  /  ALT  56<H>  /  AlkPhos  224<H>  -    I&O's Detail    PT/INR - ( 19 Dec 2023 08:31 )   PT: 11.7 sec;   INR: 1.07 ratio         PTT - ( 19 Dec 2023 08:31 )  PTT:29.6 sec  Urinalysis Basic - ( 19 Dec 2023 10:59 )    Color: Yellow / Appearance: Clear / S.028 / pH: x  Gluc: x / Ketone: Negative mg/dL  / Bili: Negative / Urobili: 0.2 mg/dL   Blood: x / Protein: Negative mg/dL / Nitrite: Negative   Leuk Esterase: Negative / RBC: 0 /HPF / WBC 1 /HPF   Sq Epi: x / Non Sq Epi: 5 /HPF / Bacteria: Negative /HPF    RADIOLOGY & ADDITIONAL STUDIES:  < from: US Doppler Pelvis (23 @ 12:29) >    ACC: [id number] EXAM:  US DPLX PELVIC   ORDERED BY:  MARINO STORM   ACC: [id number] EXAM:  US TRANSVAGINAL   ORDERED BY:  MARINO STORM     PROCEDURE DATE:  2023      INTERPRETATION:  CLINICAL INFORMATION: 38-year-old female status post egg   retrieval on 2023, pelvic pain.    LMP: 2023    COMPARISON: Abdominal pelvic CT performed earlier same day.    TECHNIQUE:  Endovaginal and transabdominal pelvic sonogram. Color and Spectral   Doppler was performed.    FINDINGS:  Uterus:8.5 x 3.1 x 4.2. The uterus is anteverted. There is a posterior   uterine body intramural fibroid measuring 1.0 x 0.7 x 1.0 cm. Additional   posterior uterine body intramural fibroid measures 1.2 x 0.9 x 1.1 cm.  Endometrium: 4 mm. Within normal limits.    Right ovary: 6.5 cm x 3.5 cm x 4.7 cm. Estimated volume 56.3 cc's.  The   right ovary is enlarged and contains multiple follicles, some of which   appear complex with low level echoes, likely hemorrhagic. There is   complex fluid with low levelechoes, partially surrounding the right   ovary, compatible with hemorrhagic fluid. Normal arterial and venous   waveforms are demonstrated within the right ovary.  Left ovary: 4.8 cm x 4.1 cm x 3.3 cm. Estimated  volume  34.1 cc's.  The   left ovaryis enlarged and contains multiple follicles, some of which   appear complex with low-level echoes, likely hemorrhagic. There is a 1.8   x 2.1 x 1.9 cm complex cyst with heterogeneous internal echoes and   solid-appearing echogenic components, withoutinternal flow demonstrated   with color Doppler. This is most compatible with a hemorrhagic ovarian   cyst with clot. Normal arterial and venous flow is demonstrated within   the left ovary. There is complex fluid with low-level echoes partially   surrounding the left ovary, compatible with hemorrhagic fluid.    Fluid: Moderate amount of complex fluid with low-level echoes within the   pelvis, compatible with blood    IMPRESSION:    1. Small uterine fibroids, as above.  2. Both ovaries are enlarged, right greater than left, and contain   multiple follicles, some of which appear hemorrhagic. Findings suggest    hyperstimulated ovaries. Suggest clinical correlation.  3. There is a 1.8 x 2.1 x 1.9 cm complex left ovarian cyst most   compatible with hemorrhagic ovarian cyst.  4. There is normal Doppler flow demonstrated within both ovaries. No   definite sonographic evidence for ovarian torsion. However, in the   setting of pelvic pain, the possibility of partial, early, or   intermittent ovarian torsion is not sonographically excluded.  5. Moderate amount of complex fluid present within the pelvis and   partially surrounding both ovaries, compatible with blood. This may be   seen in the setting of rupture of hemorrhagic ovarian cyst. Clinical and   laboratory correlation is advised.    Short-term follow-up pelvic ultrasound to confirm resolution of these   findings is advised. Gynecologic consultation is also recommended for   further evaluation and management.    These findings werediscussed with Dr. Horne of the Emergency   Department on 2023  at 1:55 PM.    --- End of Report ---    HUE GUARDADO MD; Attending Radiologist  This document has been electronically signed. Dec 19 2023  1:58PM    < end of copied text >       AGATA GROVE  38y  Female [id number]    HPI:        Name of GYN Physician:   OBHx:    GynHx: Denies fibroids, cysts, endometriosis, STI's, Abnormal pap smears   PMH:  PSH:  Meds:  Allx:  Social History:  Denies smoking use, drug use, alcohol use.   +occasional social alcohol use    Vital Signs Last 24 Hrs  T(C): 36.7 (19 Dec 2023 08:10), Max: 36.7 (19 Dec 2023 06:16)  T(F): 98.1 (19 Dec 2023 08:10), Max: 98.1 (19 Dec 2023 06:16)  HR: 61 (19 Dec 2023 13:19) (61 - 89)  BP: 118/64 (19 Dec 2023 13:19) (105/83 - 124/74)  RR: 20 (19 Dec 2023 13:19) (17 - 20)  SpO2: 98% (19 Dec 2023 13:19) (98% - 100%)    Parameters below as of 19 Dec 2023 13:19  Patient On (Oxygen Delivery Method): room air    Physical Exam:   General: sitting comfortably in bed, NAD   HEENT: neck supple, full ROM  CV: extremities well perfused  Lungs: normal respiratory effort on room air  Back: No CVA tenderness  Abd: Soft, non-tender, non-distended  :  No bleeding on pad.  External labia wnl. Bimanual exam with no cervical motion tenderness, uterus wnl, adnexa non palpable b/l.  Cervix closed vs. Cervix dilated  Speculum Exam: No active bleeding from os.  Ext: non-tender b/l, no edema     LABS:                        12.2   7.81  )-----------( 430      ( 19 Dec 2023 08:31 )             39.0         138  |  106  |  14  ----------------------------<  101<H>  4.2   |  23  |  0.53    Ca    9.2      19 Dec 2023 08:31    TPro  7.1  /  Alb  4.1  /  TBili  0.3  /  DBili  x   /  AST  35  /  ALT  56<H>  /  AlkPhos  224<H>  -    I&O's Detail    PT/INR - ( 19 Dec 2023 08:31 )   PT: 11.7 sec;   INR: 1.07 ratio         PTT - ( 19 Dec 2023 08:31 )  PTT:29.6 sec  Urinalysis Basic - ( 19 Dec 2023 10:59 )    Color: Yellow / Appearance: Clear / S.028 / pH: x  Gluc: x / Ketone: Negative mg/dL  / Bili: Negative / Urobili: 0.2 mg/dL   Blood: x / Protein: Negative mg/dL / Nitrite: Negative   Leuk Esterase: Negative / RBC: 0 /HPF / WBC 1 /HPF   Sq Epi: x / Non Sq Epi: 5 /HPF / Bacteria: Negative /HPF    RADIOLOGY & ADDITIONAL STUDIES:  < from: US Doppler Pelvis (23 @ 12:29) >    ACC: [id number] EXAM:  US DPLX PELVIC   ORDERED BY:  MARINO STORM   ACC: [id number] EXAM:  US TRANSVAGINAL   ORDERED BY:  MARINO STORM     PROCEDURE DATE:  2023      INTERPRETATION:  CLINICAL INFORMATION: 38-year-old female status post egg   retrieval on 2023, pelvic pain.    LMP: 2023    COMPARISON: Abdominal pelvic CT performed earlier same day.    TECHNIQUE:  Endovaginal and transabdominal pelvic sonogram. Color and Spectral   Doppler was performed.    FINDINGS:  Uterus:8.5 x 3.1 x 4.2. The uterus is anteverted. There is a posterior   uterine body intramural fibroid measuring 1.0 x 0.7 x 1.0 cm. Additional   posterior uterine body intramural fibroid measures 1.2 x 0.9 x 1.1 cm.  Endometrium: 4 mm. Within normal limits.    Right ovary: 6.5 cm x 3.5 cm x 4.7 cm. Estimated volume 56.3 cc's.  The   right ovary is enlarged and contains multiple follicles, some of which   appear complex with low level echoes, likely hemorrhagic. There is   complex fluid with low levelechoes, partially surrounding the right   ovary, compatible with hemorrhagic fluid. Normal arterial and venous   waveforms are demonstrated within the right ovary.  Left ovary: 4.8 cm x 4.1 cm x 3.3 cm. Estimated  volume  34.1 cc's.  The   left ovaryis enlarged and contains multiple follicles, some of which   appear complex with low-level echoes, likely hemorrhagic. There is a 1.8   x 2.1 x 1.9 cm complex cyst with heterogeneous internal echoes and   solid-appearing echogenic components, withoutinternal flow demonstrated   with color Doppler. This is most compatible with a hemorrhagic ovarian   cyst with clot. Normal arterial and venous flow is demonstrated within   the left ovary. There is complex fluid with low-level echoes partially   surrounding the left ovary, compatible with hemorrhagic fluid.    Fluid: Moderate amount of complex fluid with low-level echoes within the   pelvis, compatible with blood    IMPRESSION:    1. Small uterine fibroids, as above.  2. Both ovaries are enlarged, right greater than left, and contain   multiple follicles, some of which appear hemorrhagic. Findings suggest    hyperstimulated ovaries. Suggest clinical correlation.  3. There is a 1.8 x 2.1 x 1.9 cm complex left ovarian cyst most   compatible with hemorrhagic ovarian cyst.  4. There is normal Doppler flow demonstrated within both ovaries. No   definite sonographic evidence for ovarian torsion. However, in the   setting of pelvic pain, the possibility of partial, early, or   intermittent ovarian torsion is not sonographically excluded.  5. Moderate amount of complex fluid present within the pelvis and   partially surrounding both ovaries, compatible with blood. This may be   seen in the setting of rupture of hemorrhagic ovarian cyst. Clinical and   laboratory correlation is advised.    Short-term follow-up pelvic ultrasound to confirm resolution of these   findings is advised. Gynecologic consultation is also recommended for   further evaluation and management.    These findings werediscussed with Dr. Horne of the Emergency   Department on 2023  at 1:55 PM.    --- End of Report ---    HUE GUARDADO MD; Attending Radiologist  This document has been electronically signed. Dec 19 2023  1:58PM    < end of copied text >       ***PLEASE SEE H&P FOR DETAILS**    AGATA GROVE  38y  Female [id number]       ***PLEASE SEE H&P FOR DETAILS**    AGATA GROVE  38y  Female [id number]

## 2023-12-19 NOTE — H&P ADULT - NSHPLABSRESULTS_GEN_ALL_CORE
< end of copied text >    < from: US Doppler Pelvis (12.19.23 @ 12:29) >    ACC: [id number] EXAM:  US DPLX PELVIC   ORDERED BY:  MARINO STORM     PROCEDURE DATE:  12/19/2023        INTERPRETATION:  CLINICAL INFORMATION: 38-year-old female status post egg   retrieval on 12/6/2023, pelvic pain.    LMP: 12/11/2023    COMPARISON: Abdominal pelvic CT performed earlier same day.    TECHNIQUE:  Endovaginal and transabdominal pelvic sonogram. Color and Spectral   Doppler was performed.    IMPRESSION:    1. Small uterine fibroids, as above.  2. Both ovaries are enlarged, right greater than left, and contain   multiple follicles, some of which appear hemorrhagic. Findings suggest    hyperstimulated ovaries. Suggest clinical correlation.  3. There is a 1.8 x 2.1 x 1.9 cm complex left ovarian cyst most   compatible with hemorrhagic ovarian cyst.  4. There is normal Doppler flow demonstrated within both ovaries. No   definite sonographic evidence for ovarian torsion. However, in the   setting of pelvic pain, the possibility of partial, early, or   intermittent ovarian torsion is not sonographically excluded.  5. Moderate amount of complex fluid present within the pelvis and   partially surrounding both ovaries, compatible with blood. This may be   seen in the setting of rupture of hemorrhagic ovarian cyst. Clinical and   laboratory correlation is advised.    Short-term follow-up pelvic ultrasound to confirm resolution of these   findings is advised. Gynecologic consultation is also recommended for   further evaluation and management.    These findings were discussed with Dr. Horne of the Emergency   Department on 12/19/2023  at 1:55 PM.    < end of copied text > < end of copied text >    < from: US Doppler Pelvis (12.19.23 @ 12:29) >    ACC: [id number] EXAM:  US DPLX PELVIC   ORDERED BY:  MARINO STORM     PROCEDURE DATE:  12/19/2023        INTERPRETATION:  CLINICAL INFORMATION: 38-year-old female status post egg   retrieval on 12/6/2023, pelvic pain.    LMP: 12/11/2023    COMPARISON: Abdominal pelvic CT performed earlier same day.    TECHNIQUE:  Endovaginal and transabdominal pelvic sonogram. Color and Spectral   Doppler was performed.    IMPRESSION:    1. Small uterine fibroids, as above.  2. Both ovaries are enlarged, right greater than left, and contain   multiple follicles, some of which appear hemorrhagic. Findings suggest    hyperstimulated ovaries. Suggest clinical correlation.  3. There is a 1.8 x 2.1 x 1.9 cm complex left ovarian cyst most   compatible with hemorrhagic ovarian cyst.  4. There is normal Doppler flow demonstrated within both ovaries. No   definite sonographic evidence for ovarian torsion. However, in the   setting of pelvic pain, the possibility of partial, early, or   intermittent ovarian torsion is not sonographically excluded.  5. Moderate amount of complex fluid present within the pelvis and   partially surrounding both ovaries, compatible with blood. This may be   seen in the setting of rupture of hemorrhagic ovarian cyst. Clinical and   laboratory correlation is advised.    Short-term follow-up pelvic ultrasound to confirm resolution of these   findings is advised. Gynecologic consultation is also recommended for   further evaluation and management.    These findings were discussed with Dr. Horne of the Emergency   Department on 12/19/2023  at 1:55 PM.    < end of copied text >

## 2023-12-19 NOTE — H&P ADULT - HISTORY OF PRESENT ILLNESS
AGATA GROVE  38y  Female [id number]    HPI: Patient is a 39yo G0 LMP 12/11 now 13 days s/p egg retrieval (12/6/23) with [facility] presenting for acute worsening of right upper abdominal pain radiating to right chest and neck. Pt reports that her egg retrieval was complicated by development of hemoperitoneum. On the night of her egg retrieval she experienced a sharp abdominal pain and vasovagal syncopal episode, was taken to [facility] by ambulance. At [facility] she had serial CBCs and abdominal exams, per patient her Hgb dropped from 13->11->8. They were in communication with her [facility] physicians who suspected active bleeding from ovaries secondary to the retrieval, and stated if Hgb <8 she should receive a blood tx and return to OR to achieve hemostasis. Per pt, her Hgb stabilized at 8 and she was discharged home. She followed up with [facility] the following day (12/7/23), Hgb 8.6, underwent TVUS redemonstrating hemoperitoneum, and was reassured that her body will reabsorb the blood slowly. Since this visit she has been mainly bedbound due to discomfort, just started increasing ambulation over the weekend. She stopped Motrin/Tylenol on Sunday, reports the pain worsened after this. She presented to ED today due to intermittent worsening of RUQ abdominal pain, R chest pain that worsens with deep inspiration, and pain radiating to neck. Also reporting ongoing diffuse abdominal discomfort. Denies additional episodes of syncope, lightheadedness, palpitations, SOB, difficulty breathing, nausea, vomiting, vaginal bleeding.    GYN=Dr. Destiny Elizondo  [facility]   GynHx: Reports h/o suspected endometriosis, denies fibroids, cysts, STI's, Abnormal pap smears   PMH: denies  PSH: endometrial polypectomy (3/2023)  Meds: none  Allx: NKDA  Social History: Denies smoking use, drug use, alcohol use     AGATA GROVE  38y  Female [id number]    HPI: Patient is a 39yo G0 LMP 12/11 now 13 days s/p egg retrieval (12/6/23) with [facility] presenting for acute worsening of right upper abdominal pain radiating to right chest and neck. Pt reports that her egg retrieval was complicated by development of hemoperitoneum. On the night of her egg retrieval she experienced a sharp abdominal pain and vasovagal syncopal episode, was taken to [facility] by ambulance. At [facility] she had serial CBCs and abdominal exams, per patient her Hgb dropped from 13->11->8. They were in communication with her [facility] physicians who suspected active bleeding from ovaries secondary to the retrieval, and stated if Hgb <8 she should receive a blood tx and return to OR to achieve hemostasis. Per pt, her Hgb stabilized at 8 and she was discharged home. She followed up with [facility] the following day (12/7/23), Hgb 8.6, underwent TVUS redemonstrating hemoperitoneum, and was reassured that her body will reabsorb the blood slowly. Since this visit she has been mainly bedbound due to discomfort, just started increasing ambulation over the weekend. She stopped Motrin/Tylenol on Sunday, reports the pain worsened after this. She presented to ED today due to intermittent worsening of RUQ abdominal pain, R chest pain that worsens with deep inspiration, and pain radiating to neck. Also reporting ongoing diffuse abdominal discomfort. Denies additional episodes of syncope, lightheadedness, palpitations, SOB, difficulty breathing, nausea, vomiting, vaginal bleeding.    GYN=Dr. Destiny Elizondo  [facility]   GynHx: Reports h/o suspected endometriosis, denies fibroids, cysts, STI's, Abnormal pap smears   PMH: denies  PSH: endometrial polypectomy (3/2023)  Meds: none  Allx: NKDA  Social History: Denies smoking use, drug use, alcohol use

## 2023-12-19 NOTE — H&P ADULT - ATTENDING COMMENTS
/OBGYN Attending Note  Agree with above, patinet seen by me in the ER and admitted to the GYN service.  37 y/o  LMP 23 s/p egg retrieval by repro endo at [facility] on 23 postprocedure anemia and hemoperittoneum. presents to the ER with c/o pelvic pain. No fevers, no N/V,  PE: Pt in NAD   Abdomen: soft, +mild distension, + mild tenderness in thelower quadrants, no rebound tenderness.  Pelvic us +flow to both ovaries, B/L enlargement of the ovaries right greater than the left. small complex cyst on the left  moderate complex fluid in the pelvis  H/H 12./39 repeat H/H 10.6/33.7  A/P; 37 y/o  LMP 23 s/p egg retrieval by repro endo at [facility] on 23 with pelvic pain and hemoperitoneum.  Admit to GYN service  Likely Ovarian hyperstimulation syndrome,   Possibly still bleeding from ruptured hemorrhagic cyst   Ovarian torsion a possibility but less likely.  Patinet in pain but stable when evaluated by me.  Trend CBC   NPO discussed possibility of a diagnostic laparoscopy to evaluate for ovatrian torsion or bleeidng  Patinet stable  Supportive conservative maagement at this time  dilaudid for pain management, IVF  Serial abdominal exams  Continue to monitor.  Isabel Sage MD /OBGYN Attending Note  Agree with above, patinet seen by me in the ER and admitted to the GYN service.  39 y/o  LMP 23 s/p egg retrieval by repro endo at [facility] on 23 postprocedure anemia and hemoperittoneum. presents to the ER with c/o pelvic pain. No fevers, no N/V,  PE: Pt in NAD   Abdomen: soft, +mild distension, + mild tenderness in thelower quadrants, no rebound tenderness.  Pelvic us +flow to both ovaries, B/L enlargement of the ovaries right greater than the left. small complex cyst on the left  moderate complex fluid in the pelvis  H/H 12./39 repeat H/H 10.6/33.7  A/P; 39 y/o  LMP 23 s/p egg retrieval by repro endo at [facility] on 23 with pelvic pain and hemoperitoneum.  Admit to GYN service  Likely Ovarian hyperstimulation syndrome,   Possibly still bleeding from ruptured hemorrhagic cyst   Ovarian torsion a possibility but less likely.  Patinet in pain but stable when evaluated by me.  Trend CBC   NPO discussed possibility of a diagnostic laparoscopy to evaluate for ovatrian torsion or bleeidng  Patinet stable  Supportive conservative maagement at this time  dilaudid for pain management, IVF  Serial abdominal exams  Continue to monitor.  Isabel Sage MD

## 2023-12-19 NOTE — ED PROVIDER NOTE - PROGRESS NOTE DETAILS
gyn seeing patient regarding possible ovarian hyperstimulation syndrome and hemorragic cyst found on US. Danielle Cabezas, DO PGY1 With Gyn who would like to admit patient to their service for serial abdominal exams.  Patient understands and is aware. Danielle Cabezas, PGY1 gyn seeing patient regarding possible ovarian hyperstimulation syndrome and hemorraghic cyst found on US. Danielle Cabezas, DO PGY1

## 2023-12-19 NOTE — ED PROVIDER NOTE - OBJECTIVE STATEMENT
38-year-old female, past medical history of PCOS presents for 2 days of sharp right upper quadrant pain that radiates to her back and neck.  Patient states she recently underwent a retrieval procedure on 12\6 that was complicated by hemoperitoneum that was discovered through CT after she syncopized due to pain. 12 6.  Endorses vaginal normal. Last took Tylenol at midnight with minimal relief denies fever, chills, nausea, vomiting, diarrhea, melena, chest pain, hx of dvts, hemoptysis, recent calf pain, or swelling or any other symptoms at this time. Danielle Cabezas DO PGY1 38-year-old female, past medical history of endometriosis presents for 2 days of sharp right upper quadrant pain that radiates to her back and neck.  Patient states she recently underwent a retrieval procedure on 12\6 that was complicated by hemoperitoneum that was discovered through CT after she syncopized due to pain. 12 6.  Endorses vaginal normal. Last took Tylenol at midnight with minimal relief denies fever, chills, nausea, vomiting, diarrhea, melena, chest pain, hx of dvts, hemoptysis, recent calf pain, or swelling or any other symptoms at this time. Danielle Cabezas DO PGY1

## 2023-12-19 NOTE — ED PROVIDER NOTE - CLINICAL SUMMARY MEDICAL DECISION MAKING FREE TEXT BOX
38-year-old with recent retrieval on December 6 resulting in hemoperitoneum presents with right upper quadrant abdominal pain similar to pain experienced on December 6th. Diffusely ttp on exam, with exquisite focal tenderness in the RUQ. Cannot PERC out due to hormone use. CT abdomen pelvis to exaluate for return of hemoperitoneum, CTA to asses for PE. dispo pending results

## 2023-12-19 NOTE — ED ADULT TRIAGE NOTE - CHIEF COMPLAINT QUOTE
Pt reports R sided chest pain radiating to neck x 2 days. Pt had recent egg retrieval 10/6 c/b bleeding

## 2023-12-20 ENCOUNTER — TRANSCRIPTION ENCOUNTER (OUTPATIENT)
Age: 38
End: 2023-12-20

## 2023-12-20 VITALS
OXYGEN SATURATION: 96 % | TEMPERATURE: 99 F | HEART RATE: 84 BPM | RESPIRATION RATE: 18 BRPM | SYSTOLIC BLOOD PRESSURE: 103 MMHG | DIASTOLIC BLOOD PRESSURE: 68 MMHG

## 2023-12-20 LAB
HCT VFR BLD CALC: 32.7 % — LOW (ref 34.5–45)
HCT VFR BLD CALC: 32.7 % — LOW (ref 34.5–45)
HCT VFR BLD CALC: 33.5 % — LOW (ref 34.5–45)
HCT VFR BLD CALC: 33.5 % — LOW (ref 34.5–45)
HGB BLD-MCNC: 10.4 G/DL — LOW (ref 11.5–15.5)
HGB BLD-MCNC: 10.4 G/DL — LOW (ref 11.5–15.5)
HGB BLD-MCNC: 10.9 G/DL — LOW (ref 11.5–15.5)
HGB BLD-MCNC: 10.9 G/DL — LOW (ref 11.5–15.5)
MCHC RBC-ENTMCNC: 30.1 PG — SIGNIFICANT CHANGE UP (ref 27–34)
MCHC RBC-ENTMCNC: 30.1 PG — SIGNIFICANT CHANGE UP (ref 27–34)
MCHC RBC-ENTMCNC: 30.4 PG — SIGNIFICANT CHANGE UP (ref 27–34)
MCHC RBC-ENTMCNC: 30.4 PG — SIGNIFICANT CHANGE UP (ref 27–34)
MCHC RBC-ENTMCNC: 31.8 GM/DL — LOW (ref 32–36)
MCHC RBC-ENTMCNC: 31.8 GM/DL — LOW (ref 32–36)
MCHC RBC-ENTMCNC: 32.5 GM/DL — SIGNIFICANT CHANGE UP (ref 32–36)
MCHC RBC-ENTMCNC: 32.5 GM/DL — SIGNIFICANT CHANGE UP (ref 32–36)
MCV RBC AUTO: 93.6 FL — SIGNIFICANT CHANGE UP (ref 80–100)
MCV RBC AUTO: 93.6 FL — SIGNIFICANT CHANGE UP (ref 80–100)
MCV RBC AUTO: 94.8 FL — SIGNIFICANT CHANGE UP (ref 80–100)
MCV RBC AUTO: 94.8 FL — SIGNIFICANT CHANGE UP (ref 80–100)
NRBC # BLD: 0 /100 WBCS — SIGNIFICANT CHANGE UP (ref 0–0)
PLATELET # BLD AUTO: 359 K/UL — SIGNIFICANT CHANGE UP (ref 150–400)
PLATELET # BLD AUTO: 359 K/UL — SIGNIFICANT CHANGE UP (ref 150–400)
PLATELET # BLD AUTO: 361 K/UL — SIGNIFICANT CHANGE UP (ref 150–400)
PLATELET # BLD AUTO: 361 K/UL — SIGNIFICANT CHANGE UP (ref 150–400)
RBC # BLD: 3.45 M/UL — LOW (ref 3.8–5.2)
RBC # BLD: 3.45 M/UL — LOW (ref 3.8–5.2)
RBC # BLD: 3.58 M/UL — LOW (ref 3.8–5.2)
RBC # BLD: 3.58 M/UL — LOW (ref 3.8–5.2)
RBC # FLD: 13 % — SIGNIFICANT CHANGE UP (ref 10.3–14.5)
RBC # FLD: 13 % — SIGNIFICANT CHANGE UP (ref 10.3–14.5)
RBC # FLD: 13.1 % — SIGNIFICANT CHANGE UP (ref 10.3–14.5)
RBC # FLD: 13.1 % — SIGNIFICANT CHANGE UP (ref 10.3–14.5)
WBC # BLD: 4.03 K/UL — SIGNIFICANT CHANGE UP (ref 3.8–10.5)
WBC # BLD: 4.03 K/UL — SIGNIFICANT CHANGE UP (ref 3.8–10.5)
WBC # BLD: 5.39 K/UL — SIGNIFICANT CHANGE UP (ref 3.8–10.5)
WBC # BLD: 5.39 K/UL — SIGNIFICANT CHANGE UP (ref 3.8–10.5)
WBC # FLD AUTO: 4.03 K/UL — SIGNIFICANT CHANGE UP (ref 3.8–10.5)
WBC # FLD AUTO: 4.03 K/UL — SIGNIFICANT CHANGE UP (ref 3.8–10.5)
WBC # FLD AUTO: 5.39 K/UL — SIGNIFICANT CHANGE UP (ref 3.8–10.5)
WBC # FLD AUTO: 5.39 K/UL — SIGNIFICANT CHANGE UP (ref 3.8–10.5)

## 2023-12-20 PROCEDURE — 36415 COLL VENOUS BLD VENIPUNCTURE: CPT

## 2023-12-20 PROCEDURE — 83690 ASSAY OF LIPASE: CPT

## 2023-12-20 PROCEDURE — 86850 RBC ANTIBODY SCREEN: CPT

## 2023-12-20 PROCEDURE — 83605 ASSAY OF LACTIC ACID: CPT

## 2023-12-20 PROCEDURE — 80053 COMPREHEN METABOLIC PANEL: CPT

## 2023-12-20 PROCEDURE — 85610 PROTHROMBIN TIME: CPT

## 2023-12-20 PROCEDURE — 96375 TX/PRO/DX INJ NEW DRUG ADDON: CPT

## 2023-12-20 PROCEDURE — 82803 BLOOD GASES ANY COMBINATION: CPT

## 2023-12-20 PROCEDURE — 82947 ASSAY GLUCOSE BLOOD QUANT: CPT

## 2023-12-20 PROCEDURE — 85027 COMPLETE CBC AUTOMATED: CPT

## 2023-12-20 PROCEDURE — 74177 CT ABD & PELVIS W/CONTRAST: CPT | Mod: MA

## 2023-12-20 PROCEDURE — 86901 BLOOD TYPING SEROLOGIC RH(D): CPT

## 2023-12-20 PROCEDURE — 84295 ASSAY OF SERUM SODIUM: CPT

## 2023-12-20 PROCEDURE — 85730 THROMBOPLASTIN TIME PARTIAL: CPT

## 2023-12-20 PROCEDURE — 84484 ASSAY OF TROPONIN QUANT: CPT

## 2023-12-20 PROCEDURE — 81001 URINALYSIS AUTO W/SCOPE: CPT

## 2023-12-20 PROCEDURE — 84702 CHORIONIC GONADOTROPIN TEST: CPT

## 2023-12-20 PROCEDURE — 82330 ASSAY OF CALCIUM: CPT

## 2023-12-20 PROCEDURE — 84132 ASSAY OF SERUM POTASSIUM: CPT

## 2023-12-20 PROCEDURE — 82435 ASSAY OF BLOOD CHLORIDE: CPT

## 2023-12-20 PROCEDURE — 96374 THER/PROPH/DIAG INJ IV PUSH: CPT

## 2023-12-20 PROCEDURE — 71275 CT ANGIOGRAPHY CHEST: CPT | Mod: MA

## 2023-12-20 PROCEDURE — 85018 HEMOGLOBIN: CPT

## 2023-12-20 PROCEDURE — 85025 COMPLETE CBC W/AUTO DIFF WBC: CPT

## 2023-12-20 PROCEDURE — 76830 TRANSVAGINAL US NON-OB: CPT

## 2023-12-20 PROCEDURE — 93975 VASCULAR STUDY: CPT

## 2023-12-20 PROCEDURE — 85014 HEMATOCRIT: CPT

## 2023-12-20 PROCEDURE — 71045 X-RAY EXAM CHEST 1 VIEW: CPT

## 2023-12-20 PROCEDURE — 99285 EMERGENCY DEPT VISIT HI MDM: CPT

## 2023-12-20 PROCEDURE — 86900 BLOOD TYPING SEROLOGIC ABO: CPT

## 2023-12-20 RX ORDER — ACETAMINOPHEN 500 MG
975 TABLET ORAL EVERY 6 HOURS
Refills: 0 | Status: DISCONTINUED | OUTPATIENT
Start: 2023-12-20 | End: 2023-12-20

## 2023-12-20 RX ORDER — IBUPROFEN 200 MG
600 TABLET ORAL EVERY 6 HOURS
Refills: 0 | Status: DISCONTINUED | OUTPATIENT
Start: 2023-12-20 | End: 2023-12-20

## 2023-12-20 RX ORDER — ACETAMINOPHEN 500 MG
1000 TABLET ORAL ONCE
Refills: 0 | Status: DISCONTINUED | OUTPATIENT
Start: 2023-12-20 | End: 2023-12-20

## 2023-12-20 RX ORDER — ACETAMINOPHEN 500 MG
3 TABLET ORAL
Qty: 0 | Refills: 0 | DISCHARGE
Start: 2023-12-20

## 2023-12-20 RX ORDER — HEPARIN SODIUM 5000 [USP'U]/ML
5000 INJECTION INTRAVENOUS; SUBCUTANEOUS EVERY 12 HOURS
Refills: 0 | Status: DISCONTINUED | OUTPATIENT
Start: 2023-12-20 | End: 2023-12-20

## 2023-12-20 RX ORDER — IBUPROFEN 200 MG
1 TABLET ORAL
Qty: 0 | Refills: 0 | DISCHARGE
Start: 2023-12-20

## 2023-12-20 RX ORDER — OXYCODONE HYDROCHLORIDE 5 MG/1
5 TABLET ORAL EVERY 4 HOURS
Refills: 0 | Status: DISCONTINUED | OUTPATIENT
Start: 2023-12-20 | End: 2023-12-20

## 2023-12-20 RX ORDER — SODIUM CHLORIDE 9 MG/ML
3 INJECTION INTRAMUSCULAR; INTRAVENOUS; SUBCUTANEOUS EVERY 8 HOURS
Refills: 0 | Status: DISCONTINUED | OUTPATIENT
Start: 2023-12-20 | End: 2023-12-20

## 2023-12-20 RX ADMIN — Medication 600 MILLIGRAM(S): at 13:00

## 2023-12-20 RX ADMIN — SODIUM CHLORIDE 3 MILLILITER(S): 9 INJECTION INTRAMUSCULAR; INTRAVENOUS; SUBCUTANEOUS at 13:19

## 2023-12-20 RX ADMIN — Medication 975 MILLIGRAM(S): at 10:09

## 2023-12-20 RX ADMIN — HEPARIN SODIUM 5000 UNIT(S): 5000 INJECTION INTRAVENOUS; SUBCUTANEOUS at 08:52

## 2023-12-20 RX ADMIN — Medication 1000 MILLIGRAM(S): at 02:22

## 2023-12-20 RX ADMIN — Medication 975 MILLIGRAM(S): at 10:45

## 2023-12-20 RX ADMIN — Medication 600 MILLIGRAM(S): at 12:22

## 2023-12-20 RX ADMIN — Medication 15 MILLIGRAM(S): at 00:23

## 2023-12-20 RX ADMIN — Medication 400 MILLIGRAM(S): at 01:52

## 2023-12-20 NOTE — PROGRESS NOTE ADULT - SUBJECTIVE AND OBJECTIVE BOX
[facility]UB[facility] SANON Progress Note  POD#14   HD#2    Patient seen and examined at bedside.  No acute events overnight. No acute complaints.  Pain well controlled. Patient states her pain is a 0/10 this morning and she has slight discomfort only. Patient is ambulating and NPO overnight. Patient is passing flatus.  Patient is voiding spontaneously. Denies CP, SOB, N/V, fevers, and chills.    Vital Signs Last 24 Hours  T(C): 36.6 (12-20-23 @ 05:35), Max: 36.8 (12-19-23 @ 23:10)  HR: 61 (12-20-23 @ 05:35) (56 - 78)  BP: 110/72 (12-20-23 @ 05:35) (97/63 - 124/74)  RR: 18 (12-20-23 @ 05:35) (16 - 20)  SpO2: 99% (12-20-23 @ 05:35) (98% - 100%)    I&O's Summary    19 Dec 2023 07:01  -  20 Dec 2023 07:00  --------------------------------------------------------  IN: 750 mL / OUT: 250 mL / NET: 500 mL        Physical Exam:  General: NAD  CV: RR, S1, S2, no M/R/G  Lungs: CTA b/l, good air flow b/l   Abdomen: Soft, nontender, nondistended, normoactive bowel sounds  Ext: No pain or swelling     Labs:                        10.9   5.28  )-----------( 388      ( 19 Dec 2023 21:08 )             33.9   baso 0.8    eos 1.7    imm gran 0.2    lymph 25.0   mono 9.5    poly 62.8                         10.6   6.09  )-----------( 337      ( 19 Dec 2023 16:42 )             33.7   baso x      eos x      imm gran x      lymph x      mono x      poly x                            12.2   7.81  )-----------( 430      ( 19 Dec 2023 08:31 )             39.0   baso 0.6    eos 0.8    imm gran 0.4    lymph 16.4   mono 9.0    poly 72.8       MEDICATIONS  (STANDING):  acetaminophen   IVPB .. 1000 milliGRAM(s) IV Intermittent once  lactated ringers. 1000 milliLiter(s) (125 mL/Hr) IV Continuous <Continuous>

## 2023-12-20 NOTE — DISCHARGE NOTE PROVIDER - PROVIDER TOKENS
PROVIDER:[TOKEN:[[id number]],FOLLOWUP:[1 week]] PROVIDER:[TOKEN:[[id number]],FOLLOWUP:[1 week]]

## 2023-12-20 NOTE — DISCHARGE NOTE NURSING/CASE MANAGEMENT/SOCIAL WORK - PATIENT PORTAL LINK FT
You can access the FollowMyHealth Patient Portal offered by [facility] by registering at the following website: [email or web address]. By joining [employer]’s FollowMyHealth portal, you will also be able to view your health information using other applications (apps) compatible with our system. You can access the FollowMyHealth Patient Portal offered by [facility] by registering at the following website: [email or web address]. By joining [employer]’s FollowMyHealth portal, you will also be able to view your health information using other applications (apps) compatible with our system.

## 2023-12-20 NOTE — DISCHARGE NOTE NURSING/CASE MANAGEMENT/SOCIAL WORK - NSDCPEFALRISK_GEN_ALL_CORE
For information on Fall & Injury Prevention, visit: [email or web address] OR  [email or web address] OR  https://www.cdc.gov/steadi/patient.html For information on Fall & Injury Prevention, visit: [email or web address] OR  [email or web address] OR  https://www.cdc.gov/steadi/patient.html

## 2023-12-20 NOTE — DISCHARGE NOTE PROVIDER - NSDCFUADDINST_GEN_ALL_CORE_FT
Discharge Instructions:    GYN  Continue your regular way of eating.  Resume normal activity as tolerated. Call your doctor with any signs and symptoms of infection such as fever (especially greater than 100.4F), chills, nausea or vomiting. Call your doctor with dizziness or lightheadedness. Call your doctor if you're unable to tolerate food or have difficulty urinating. Call your doctor if you have pain that is not relieved by over the counter medications.  Notify your doctor with any other concerns.    Medications:   - Ibuprofen 600mg every 6 hours as needed for pain  - Acetaminophen 975mg every 6 hours as needed for pain    Please follow up with your GYN Dr. Elizondo as soon as possible.

## 2023-12-20 NOTE — PATIENT PROFILE ADULT - FALL HARM RISK - HARM RISK INTERVENTIONS
Assistance with ambulation/Assistance OOB with selected safe patient handling equipment/Communicate Risk of Fall with Harm to all staff/Reinforce activity limits and safety measures with patient and family/Tailored Fall Risk Interventions/Visual Cue: Yellow wristband and red socks/Bed in lowest position, wheels locked, appropriate side rails in place/Call bell, personal items and telephone in reach/Instruct patient to call for assistance before getting out of bed or chair/Non-slip footwear when patient is out of bed/[place] to call system/Physically safe environment - no spills, clutter or unnecessary equipment/Purposeful Proactive Rounding/Room/bathroom lighting operational, light cord in reach Assistance with ambulation/Assistance OOB with selected safe patient handling equipment/Communicate Risk of Fall with Harm to all staff/Reinforce activity limits and safety measures with patient and family/Tailored Fall Risk Interventions/Visual Cue: Yellow wristband and red socks/Bed in lowest position, wheels locked, appropriate side rails in place/Call bell, personal items and telephone in reach/Instruct patient to call for assistance before getting out of bed or chair/Non-slip footwear when patient is out of bed/[place] to call system/Physically safe environment - no spills, clutter or unnecessary equipment/Purposeful Proactive Rounding/Room/bathroom lighting operational, light cord in reach

## 2023-12-20 NOTE — PROGRESS NOTE ADULT - ASSESSMENT
A/P: 38y POD#14 s/p egg retrieval c/b syncope and hemoperitoneum admitted for serial abdominal exams and close monitoring. Pain well controlled overnight, pt clinically and hemodynamically stable.    Neuro: pain well controlled on current regimen, no pain reported this morning.   CV: hemodynamically stable, Hct 39.0->33.7->33.9, stable f/u AM CBC  Pulm: O2 sat WNL on RA, increase ambulation, encourage incentive spirometry use  GI: NPO  : voiding spontaneously  Heme: SCDs while in bed for DVT ppx  ID: afebrile, no signs of infection  Dispo: continue routine inpatient    Eli Omalley MD PGY4

## 2023-12-20 NOTE — PATIENT PROFILE ADULT - NSPRESCRALCSCORE_GEN_A_NUR_CAL
You can access the FollowMyHealth Patient Portal offered by [facility] by registering at the following website: [email or web address]. By joining [employer]’s FollowMyHealth portal, you will also be able to view your health information using other applications (apps) compatible with our system. 3

## 2023-12-20 NOTE — DISCHARGE NOTE PROVIDER - HOSPITAL COURSE
39 y/o on POD#13 s/p egg retrieval on 12/6 at [facility] c/b hemoperitoneum presenting with acute right upper abdominal pain radiating to right chest and neck.    On night of her procedure, pt experienced sharp abdominal pain and vasovagal syncope. Presented to [facility] [place], found to have downtrending hemoglobin stabilizing at 8. Active bleeding suspected from ovaries. Discharged home after hemoglobin remained stable. TVUS the following day with [facility] demonstrated hemoperitoneum, pt was assured that body would reabsorb it over time. She has been bed bound since due to discomfort. Over weekend, patient stopped taking Tylenol/Motrin and began having increased pain and presented to [facility] ED for evaluation.     In ED, vital signs wnl, labs significant for Hgb 12.2. TVUS showed enlarged ovaries bilaterally R>L, left side showing evidence of recently ruptured hemm cyst. Admitted to GYN for serial abd exams and pain control. Made NPO and started on IV tylenol.    On HD#2, patient had no acute complaints overnight. Pain has resolved to 0/10, endorses only slight discomfort. Vital signs remain stable and patient appears well. 37 y/o on POD#13 s/p egg retrieval on 12/6 at [facility] c/b hemoperitoneum presenting with acute right upper abdominal pain radiating to right chest and neck.    On night of her procedure, pt experienced sharp abdominal pain and vasovagal syncope. Presented to [facility] [place], found to have downtrending hemoglobin stabilizing at 8. Active bleeding suspected from ovaries. Discharged home after hemoglobin remained stable. TVUS the following day with [facility] demonstrated hemoperitoneum, pt was assured that body would reabsorb it over time. She has been bed bound since due to discomfort. Over weekend, patient stopped taking Tylenol/Motrin and began having increased pain and presented to [facility] ED for evaluation.     In ED, vital signs wnl, labs significant for Hgb 12.2. TVUS showed enlarged ovaries bilaterally R>L, left side showing evidence of recently ruptured hemm cyst. Admitted to GYN for serial abd exams and pain control. Made NPO and started on IV tylenol.    On HD#2, patient had no acute complaints overnight. Pain has resolved to 0/10, endorses only slight discomfort. Vital signs remain stable and patient appears well. 39 y/o on POD#13 s/p egg retrieval on 12/6 at [facility] c/b hemoperitoneum presenting with acute right upper abdominal pain radiating to right chest and neck.    On night of her procedure, pt experienced sharp abdominal pain and vasovagal syncope. Presented to [facility], found to have downtrending hemoglobin stabilizing at 8. Active bleeding suspected from ovaries. Discharged home after hemoglobin remained stable. TVUS the following day with [facility] demonstrated hemoperitoneum, pt was assured that body would reabsorb it over time. She has been bed bound since due to discomfort. Over weekend, patient stopped taking Tylenol/Motrin and began having increased pain and presented to [facility] for evaluation.     In ED, vital signs wnl, labs significant for Hgb 12.2. TVUS showed enlarged ovaries bilaterally R>L, left side showing evidence of recently ruptured hemm cyst. Admitted to GYN for serial abd exams and pain control. Made NPO and started on IV tylenol.    On HD#2, patient had no acute complaints overnight. Pain resolved to 0/10, endorsed only slight discomfort. Vital signs remained stable, patient was voiding, ambulating, and tolerating PO without issues.  Patient was discharged home with plans to follow up with her [facility] at [facility]. 37 y/o on POD#13 s/p egg retrieval on 12/6 at [facility] c/b hemoperitoneum presenting with acute right upper abdominal pain radiating to right chest and neck.    On night of her procedure, pt experienced sharp abdominal pain and vasovagal syncope. Presented to [facility] [place], found to have downtrending hemoglobin stabilizing at 8. Active bleeding suspected from ovaries. Discharged home after hemoglobin remained stable. TVUS the following day with [facility] demonstrated hemoperitoneum, pt was assured that body would reabsorb it over time. She has been bed bound since due to discomfort. Over weekend, patient stopped taking Tylenol/Motrin and began having increased pain and presented to [facility] for evaluation.     In ED, vital signs wnl, labs significant for Hgb 12.2. TVUS showed enlarged ovaries bilaterally R>L, left side showing evidence of recently ruptured hemm cyst. Admitted to GYN for serial abd exams and pain control. Made NPO and started on IV tylenol.    On HD#2, patient had no acute complaints overnight. Pain resolved to 0/10, endorsed only slight discomfort. Vital signs remained stable, patient was voiding, ambulating, and tolerating PO without issues.  Patient was discharged home with plans to follow up with her HASMU[facility] at [facility].

## 2023-12-20 NOTE — CHART NOTE - NSCHARTNOTEFT_GEN_A_CORE
R2 GYN Eval  Note delayed due to clinical responsibilities.    Pt seen overnight at approx 4a at bedside. Pt sleeping comfortably. Pt denies pain after receiving Tylenol. Denies n/v, fevers, chills, chest pain, SOB.    Vital Signs Last 24 Hrs  T(C): 36.7 (20 Dec 2023 01:20), Max: 36.8 (19 Dec 2023 23:10)  T(F): 98 (20 Dec 2023 01:20), Max: 98.2 (19 Dec 2023 23:10)  HR: 56 (20 Dec 2023 01:20) (56 - 78)  BP: 107/65 (20 Dec 2023 01:20) (97/63 - 124/74)  BP(mean): 79 (19 Dec 2023 22:32) (74 - 82)  RR: 17 (20 Dec 2023 01:20) (16 - 20)  SpO2: 98% (20 Dec 2023 01:20) (98% - 100%)    Parameters below as of 20 Dec 2023 01:20  Patient On (Oxygen Delivery Method): room air    Physical Exam:  Gen: well appearing, NAD  CV: clinically well perfused  Lungs: respiring comfortably on room air  Abd: soft, non-distended, +tenderness to LLQ>RLQ without rebound or guarding  : no bleeding on pad    A/P: 38y POD#14 s/p egg retrieval c/b syncope and hemoperitoneum admitted for serial abdominal exams and close monitoring. Pain well controlled overnight, pt clinically and hemodynamically stable.    -Hct 39.0->33.7->33.9, stable  -F/u AM CBC  -Continue IV Tylenol for pain  -Will reassess in AM or PRN    D/w Dr. Singh, GYN service attending  [facility] PGY2 R2 GYN Eval  Note delayed due to clinical responsibilities.    Pt seen overnight at approx 4a at bedside. Pt sleeping comfortably. Pt denies pain after receiving Tylenol. Denies n/v, fevers, chills, chest pain, SOB.    Vital Signs Last 24 Hrs  T(C): 36.7 (20 Dec 2023 01:20), Max: 36.8 (19 Dec 2023 23:10)  T(F): 98 (20 Dec 2023 01:20), Max: 98.2 (19 Dec 2023 23:10)  HR: 56 (20 Dec 2023 01:20) (56 - 78)  BP: 107/65 (20 Dec 2023 01:20) (97/63 - 124/74)  BP(mean): 79 (19 Dec 2023 22:32) (74 - 82)  RR: 17 (20 Dec 2023 01:20) (16 - 20)  SpO2: 98% (20 Dec 2023 01:20) (98% - 100%)    Parameters below as of 20 Dec 2023 01:20  Patient On (Oxygen Delivery Method): room air    Physical Exam:  Gen: well appearing, NAD  CV: clinically well perfused  Lungs: respiring comfortably on room air  Abd: soft, non-distended, +tenderness to LLQ>RLQ without rebound or guarding  : no bleeding on pad    A/P: 38y POD#14 s/p egg retrieval c/b syncope and hemoperitoneum admitted for serial abdominal exams and close monitoring. Pain well controlled overnight, pt clinically and hemodynamically stable.    -Hct 39.0->33.7->33.9, stable  -F/u AM CBC  -Continue IV Tylenol for pain  -Will reassess in AM or PRN    D/w Dr. Singh, GYN service attending  [facility] PGY2

## 2023-12-20 NOTE — DISCHARGE NOTE PROVIDER - NSDCMRMEDTOKEN_GEN_ALL_CORE_FT
acetaminophen 325 mg oral tablet: 3 tab(s) orally every 6 hours as needed for  moderate pain  ibuprofen 600 mg oral tablet: 1 tab(s) orally every 6 hours as needed for  moderate pain

## 2025-08-15 PROBLEM — N83.209 UNSPECIFIED OVARIAN CYST, UNSPECIFIED SIDE: Chronic | Status: ACTIVE | Noted: 2023-12-19

## 2025-08-18 ENCOUNTER — APPOINTMENT (OUTPATIENT)
Dept: NEUROLOGY | Facility: CLINIC | Age: 40
End: 2025-08-18
Payer: COMMERCIAL

## 2025-08-18 VITALS
BODY MASS INDEX: 23.56 KG/M2 | DIASTOLIC BLOOD PRESSURE: 70 MMHG | SYSTOLIC BLOOD PRESSURE: 103 MMHG | HEART RATE: 56 BPM | HEIGHT: 60 IN | WEIGHT: 120 LBS

## 2025-08-18 DIAGNOSIS — G43.109 MIGRAINE WITH AURA, NOT INTRACTABLE, W/OUT STATUS MIGRAINOSUS: ICD-10-CM

## 2025-08-18 DIAGNOSIS — E28.2 POLYCYSTIC OVARIAN SYNDROME: ICD-10-CM

## 2025-08-18 PROCEDURE — 99205 OFFICE O/P NEW HI 60 MIN: CPT

## 2025-08-18 RX ORDER — RIZATRIPTAN BENZOATE 5 MG/1
5 TABLET ORAL
Qty: 9 | Refills: 5 | Status: ACTIVE | COMMUNITY
Start: 2025-08-18 | End: 1900-01-01

## 2025-08-27 ENCOUNTER — OUTPATIENT (OUTPATIENT)
Dept: OUTPATIENT SERVICES | Facility: HOSPITAL | Age: 40
LOS: 1 days | End: 2025-08-27
Payer: COMMERCIAL

## 2025-08-27 ENCOUNTER — APPOINTMENT (OUTPATIENT)
Dept: MRI IMAGING | Facility: CLINIC | Age: 40
End: 2025-08-27
Payer: COMMERCIAL

## 2025-08-27 DIAGNOSIS — Z98.890 OTHER SPECIFIED POSTPROCEDURAL STATES: Chronic | ICD-10-CM

## 2025-08-27 DIAGNOSIS — G43.109 MIGRAINE WITH AURA, NOT INTRACTABLE, WITHOUT STATUS MIGRAINOSUS: ICD-10-CM

## 2025-08-27 PROCEDURE — 70551 MRI BRAIN STEM W/O DYE: CPT | Mod: 26

## 2025-08-27 PROCEDURE — 70551 MRI BRAIN STEM W/O DYE: CPT
